# Patient Record
Sex: FEMALE | Race: WHITE
[De-identification: names, ages, dates, MRNs, and addresses within clinical notes are randomized per-mention and may not be internally consistent; named-entity substitution may affect disease eponyms.]

---

## 2019-07-31 ENCOUNTER — HOSPITAL ENCOUNTER (EMERGENCY)
Dept: HOSPITAL 10 - FTE | Age: 44
LOS: 1 days | Discharge: HOME | End: 2019-08-01
Payer: COMMERCIAL

## 2019-07-31 ENCOUNTER — HOSPITAL ENCOUNTER (EMERGENCY)
Dept: HOSPITAL 91 - FTE | Age: 44
LOS: 1 days | Discharge: HOME | End: 2019-08-01
Payer: COMMERCIAL

## 2019-07-31 VITALS — DIASTOLIC BLOOD PRESSURE: 61 MMHG | HEART RATE: 89 BPM | SYSTOLIC BLOOD PRESSURE: 140 MMHG | RESPIRATION RATE: 18 BRPM

## 2019-07-31 VITALS
WEIGHT: 255.52 LBS | WEIGHT: 255.52 LBS | HEIGHT: 62 IN | BODY MASS INDEX: 47.02 KG/M2 | BODY MASS INDEX: 47.02 KG/M2 | HEIGHT: 62 IN

## 2019-07-31 DIAGNOSIS — M75.51: Primary | ICD-10-CM

## 2019-07-31 PROCEDURE — 96372 THER/PROPH/DIAG INJ SC/IM: CPT

## 2019-07-31 PROCEDURE — 81025 URINE PREGNANCY TEST: CPT

## 2019-07-31 PROCEDURE — 99284 EMERGENCY DEPT VISIT MOD MDM: CPT

## 2019-07-31 RX ADMIN — KETOROLAC TROMETHAMINE 1 MG: 30 INJECTION, SOLUTION INTRAMUSCULAR at 23:28

## 2019-07-31 RX ADMIN — ONDANSETRON 1 MG: 4 TABLET, ORALLY DISINTEGRATING ORAL at 23:24

## 2019-07-31 NOTE — ERD
ER Documentation


Chief Complaint


Chief Complaint





right shoulder pain since yesterday. denies trauma





HPI


This is a 44-year-old female who presents emergency department with complaints 


of right shoulder pain upon waking up at 2 PM.  Stated that she has been lying 


on his right side and this is the reason why she woke up with the right shoulder


pain.





LMP: 2019.   A0.   





Denies headache, head injury, loss of consciousness, dizziness, neck pain, neck 


stiffness, throat pain, difficulty swallowing, difficulty breathing lying flat, 


shoulder pain, chest pain, back pain, abdominal pain, nausea, vomiting, 


constipation, diarrhea, urinary symptoms, pregnancy or possibility being 


pregnant, loss of bowel and bladder control, trauma, injury, falls, difficulty 


walking due to pain, numbness or tingling sensation, calf pain, recent travel, 


recent major surgery in the last 3 weeks, calf pain, recent long travel, recent 


exposure to any illness, recent antibiotic use in the last 3 months, fever, 


chills, seizures.





Past medical history: Denies.


Surgical history:  x1.


Social: Denies smoking, use of alcoholic beverages, use of illegal drugs.





ROS


All systems reviewed and are negative except as per history of present illness.





Medications


Home Meds


Active Scripts


Cyclobenzaprine Hcl* (Cyclobenzaprine Hcl*) 10 Mg Tablet, 10 MG PO TID PRN for 


MUSCLE SPASMS, #15 TAB


   Prov:ALVERTOJERMAINHAN F         19


Ibuprofen* (Motrin*) 800 Mg Tab, 800 MG PO Q8 PRN for PAIN AND OR ELEVATED TEMP,


#30 TAB


   Prov:ALVERTOJERMAINLEOPOLDOAR F         19





Allergies


Allergies:  


Coded Allergies:  


     No Known Drug Allergies (Verified  Allergy, Unknown, 19)





PMhx/Soc


Medical and Surgical Hx:  pt denies Medical Hx, pt denies Surgical Hx


Hx Alcohol Use:  No


Hx Substance Use:  No


Hx Tobacco Use:  No


Smoking Status:  Never smoker





Physical Exam


Vitals





Physical Exam


Const:   No acute distress


Head:   Atraumatic 


Eyes:    Normal Conjunctiva


ENT:    Normal External Ears, Nose and Mouth.


Neck:               Full range of motion. No meningismus.


Resp:   Clear to auscultation bilaterally


Cardio:   Regular rate and rhythm, no murmurs


Abd:    Soft, non tender, non distended. Normal bowel sounds


Skin:   No petechiae or rashes


Back:   No midline or flank tenderness.  C-spine/T-spine/L-spine are midline 


with good and full range of motion and has no swelling/deformity/bulging/point 


of tenderness/midline tenderness.


Ext:    No cyanosis, or edema.  Right shoulder: No obvious swelling.  No obvious


deformity.  Good and full range of motion but with pain.  Tenderness to 


palpation to anterior area.  Right clavicle area: No crepitus.  No deformity.  


No tenderness.  No discoloration.  Right humerus is unremarkable.  Right elbow 


is unremarkable.  Right hand is good and full function.  Left upper extremity is


unremarkable.  Capillary feels to bilateral upper and lower extremities are less


than 2 seconds.  No neurovascular deficits.


Neur:   Awake and alert.  No neurological deficits.


Psych:    Normal Mood and Affect


Results 24 hrs





Laboratory Tests


                    Test
                      19
23:23


                    POC Beta HCG, Qualitative  NEGATIVE





Current Medications


 Medications
   Dose
          Sig/Juaquin
       Start Time
   Status  Last


 (Trade)       Ordered        Route
 PRN     Stop Time              Admin
Dose


                              Reason                                Admin


 Ketorolac
     30 mg          ONCE  STAT
    19       DC           19


Tromethamine
                 IM
            23:05
                       23:28



 (Toradol)                                   19 23:06


 Morphine       4 mg           ONCE  STAT
    19       DC           19


Sulfate
                      IM
            23:05
                       23:25



(morphine)                                   19 23:06


 Ondansetron    4 mg           ONCE  STAT
    19       DC           19


HCl
  (Zofran                 ODT
           23:05
                       23:24



Odt)                                         19 23:06








Procedures/MDM


Diagnostic tests: I offered diagnostic imaging but patient strongly refused. 


Stated that she doesn't need this. 


POC urine pregnancy: Negative. 





Treatment: Toradol IM.  Morphine IM.  Zofran ODT.





Re-evaluation: Denies shoulder pain.  Has good and full range of motion of the 


right shoulder.  Has good and full function of her right hand.  No neurovascular


deficits.  Stated that she feels much better at this time and that she is ready 


to go home.  Patient and family member stated that they are comfortable to go 


home.





Differential diagnosis


I have low suspicion for displaced fracture, compartment syndrome, septic bu


rsitis.





Final diagnosis: Shoulder pain.  Bursitis.





Prescription: Motrin.  Flexeril.





Follow-up with PCP in the next 24-48 hours.  PCP to do an MRI.  Come back here 


in the emergency department for any new symptoms or any worsening symptoms.  





All questions and concerns were answered.  Patient and family members verbalized


understanding and agreed with plan of care.  





Hemodynamically stable on discharge.





Departure


Diagnosis:  


   Primary Impression:  


   Shoulder bursitis


   Additional Impression:  


   Shoulder pain


Condition:  Stable





Additional Instructions:  


Follow-up with PCP in the next 24-48 hours.  PCP to do an MRI.  Come back here 


in the emergency department for any new symptoms or any worsening symptoms.











HAN COLE               2019 23:09